# Patient Record
Sex: FEMALE | Race: AMERICAN INDIAN OR ALASKA NATIVE
[De-identification: names, ages, dates, MRNs, and addresses within clinical notes are randomized per-mention and may not be internally consistent; named-entity substitution may affect disease eponyms.]

---

## 2018-10-08 ENCOUNTER — HOSPITAL ENCOUNTER (OUTPATIENT)
Dept: HOSPITAL 5 - SPVWC | Age: 33
Discharge: HOME | End: 2018-10-08
Attending: FAMILY MEDICINE
Payer: MEDICAID

## 2018-10-08 DIAGNOSIS — Q51.810: Primary | ICD-10-CM

## 2018-10-08 PROCEDURE — 76830 TRANSVAGINAL US NON-OB: CPT

## 2018-10-08 PROCEDURE — 76856 US EXAM PELVIC COMPLETE: CPT

## 2018-10-09 NOTE — ULTRASOUND REPORT
TRANSABDOMINAL AND TRANSVAGINAL PELVIC ULTRASOUND: 10/08/18 15:35:00



CLINICAL: Heavy menses



FINDINGS: Transabdominal and transvaginal pelvic ultrasound 

demonstrated a normal size uterus measuring 9.1 x 4.8 x 5.6 cm.  Normal 

uterine contour and echogenicity.  A pattern of thickened fundal 

myometrium is consistent with an arcuate uterus which is a variant of 

normal anatomy.The endometrium is moderately thickened and measures 15 

mm AP thickness.  A 4 mm nabothian cyst of the cervix.



Normal ovaries with a dominant follicle in the right ovary measuring 

1.8 cm. The right ovary measures 3.8 x 2.1 x 2.9cm.  The left ovary 

measures 3.7 x 1.9 x 2.6cm. 



No adnexal mass.  Mild physiologic free fluid. Normal urinary bladder.



IMPRESSION: 1.  Arcuate uterus with moderate endometrial thickening.  

2.  No uterine fibroid or mass.  3.  Normal ovaries.